# Patient Record
Sex: MALE | Race: WHITE | NOT HISPANIC OR LATINO | Employment: FULL TIME | URBAN - METROPOLITAN AREA
[De-identification: names, ages, dates, MRNs, and addresses within clinical notes are randomized per-mention and may not be internally consistent; named-entity substitution may affect disease eponyms.]

---

## 2023-11-22 ENCOUNTER — LAB (OUTPATIENT)
Dept: LAB | Facility: HOSPITAL | Age: 53
End: 2023-11-22
Payer: OTHER GOVERNMENT

## 2023-11-22 ENCOUNTER — OFFICE VISIT (OUTPATIENT)
Dept: FAMILY MEDICINE CLINIC | Facility: CLINIC | Age: 53
End: 2023-11-22
Payer: OTHER GOVERNMENT

## 2023-11-22 VITALS
BODY MASS INDEX: 34.1 KG/M2 | SYSTOLIC BLOOD PRESSURE: 132 MMHG | HEART RATE: 72 BPM | WEIGHT: 280 LBS | HEIGHT: 76 IN | DIASTOLIC BLOOD PRESSURE: 80 MMHG | OXYGEN SATURATION: 98 %

## 2023-11-22 DIAGNOSIS — I88.9 LYMPHADENITIS: ICD-10-CM

## 2023-11-22 DIAGNOSIS — R22.1 NECK MASS: ICD-10-CM

## 2023-11-22 DIAGNOSIS — Z13.0 SCREENING FOR DEFICIENCY ANEMIA: ICD-10-CM

## 2023-11-22 DIAGNOSIS — Z11.59 ENCOUNTER FOR HCV SCREENING TEST FOR LOW RISK PATIENT: ICD-10-CM

## 2023-11-22 DIAGNOSIS — Z76.89 ENCOUNTER TO ESTABLISH CARE: Primary | ICD-10-CM

## 2023-11-22 DIAGNOSIS — Z13.220 SCREENING FOR LIPID DISORDERS: ICD-10-CM

## 2023-11-22 DIAGNOSIS — Z11.4 SCREENING FOR HIV (HUMAN IMMUNODEFICIENCY VIRUS): ICD-10-CM

## 2023-11-22 LAB
ALBUMIN SERPL-MCNC: 4.8 G/DL (ref 3.5–5.2)
ALBUMIN/GLOB SERPL: 1.9 G/DL
ALP SERPL-CCNC: 121 U/L (ref 39–117)
ALT SERPL W P-5'-P-CCNC: 20 U/L (ref 1–41)
ANION GAP SERPL CALCULATED.3IONS-SCNC: 10.7 MMOL/L (ref 5–15)
AST SERPL-CCNC: 18 U/L (ref 1–40)
BASOPHILS # BLD AUTO: 0.04 10*3/MM3 (ref 0–0.2)
BASOPHILS NFR BLD AUTO: 0.7 % (ref 0–1.5)
BILIRUB SERPL-MCNC: 0.3 MG/DL (ref 0–1.2)
BUN SERPL-MCNC: 17 MG/DL (ref 6–20)
BUN/CREAT SERPL: 14.5 (ref 7–25)
CALCIUM SPEC-SCNC: 9.8 MG/DL (ref 8.6–10.5)
CHLORIDE SERPL-SCNC: 104 MMOL/L (ref 98–107)
CO2 SERPL-SCNC: 25.3 MMOL/L (ref 22–29)
CREAT SERPL-MCNC: 1.17 MG/DL (ref 0.76–1.27)
DEPRECATED RDW RBC AUTO: 43.4 FL (ref 37–54)
EGFRCR SERPLBLD CKD-EPI 2021: 73.6 ML/MIN/1.73
EOSINOPHIL # BLD AUTO: 0.11 10*3/MM3 (ref 0–0.4)
EOSINOPHIL NFR BLD AUTO: 2 % (ref 0.3–6.2)
ERYTHROCYTE [DISTWIDTH] IN BLOOD BY AUTOMATED COUNT: 13.2 % (ref 12.3–15.4)
GLOBULIN UR ELPH-MCNC: 2.5 GM/DL
GLUCOSE SERPL-MCNC: 94 MG/DL (ref 65–99)
HBA1C MFR BLD: 6 % (ref 4.8–5.6)
HCT VFR BLD AUTO: 45.1 % (ref 37.5–51)
HGB BLD-MCNC: 15.4 G/DL (ref 13–17.7)
IMM GRANULOCYTES # BLD AUTO: 0.02 10*3/MM3 (ref 0–0.05)
IMM GRANULOCYTES NFR BLD AUTO: 0.4 % (ref 0–0.5)
LYMPHOCYTES # BLD AUTO: 1.94 10*3/MM3 (ref 0.7–3.1)
LYMPHOCYTES NFR BLD AUTO: 34.5 % (ref 19.6–45.3)
MCH RBC QN AUTO: 30.9 PG (ref 26.6–33)
MCHC RBC AUTO-ENTMCNC: 34.1 G/DL (ref 31.5–35.7)
MCV RBC AUTO: 90.6 FL (ref 79–97)
MONOCYTES # BLD AUTO: 0.35 10*3/MM3 (ref 0.1–0.9)
MONOCYTES NFR BLD AUTO: 6.2 % (ref 5–12)
NEUTROPHILS NFR BLD AUTO: 3.16 10*3/MM3 (ref 1.7–7)
NEUTROPHILS NFR BLD AUTO: 56.2 % (ref 42.7–76)
NRBC BLD AUTO-RTO: 0 /100 WBC (ref 0–0.2)
PLATELET # BLD AUTO: 285 10*3/MM3 (ref 140–450)
PMV BLD AUTO: 9.6 FL (ref 6–12)
POTASSIUM SERPL-SCNC: 4.5 MMOL/L (ref 3.5–5.2)
PROT SERPL-MCNC: 7.3 G/DL (ref 6–8.5)
RBC # BLD AUTO: 4.98 10*6/MM3 (ref 4.14–5.8)
SODIUM SERPL-SCNC: 140 MMOL/L (ref 136–145)
TSH SERPL DL<=0.05 MIU/L-ACNC: 2.18 UIU/ML (ref 0.27–4.2)
WBC NRBC COR # BLD AUTO: 5.62 10*3/MM3 (ref 3.4–10.8)

## 2023-11-22 PROCEDURE — 83036 HEMOGLOBIN GLYCOSYLATED A1C: CPT | Performed by: STUDENT IN AN ORGANIZED HEALTH CARE EDUCATION/TRAINING PROGRAM

## 2023-11-22 PROCEDURE — 80053 COMPREHEN METABOLIC PANEL: CPT | Performed by: STUDENT IN AN ORGANIZED HEALTH CARE EDUCATION/TRAINING PROGRAM

## 2023-11-22 PROCEDURE — 84443 ASSAY THYROID STIM HORMONE: CPT | Performed by: STUDENT IN AN ORGANIZED HEALTH CARE EDUCATION/TRAINING PROGRAM

## 2023-11-22 PROCEDURE — 85025 COMPLETE CBC W/AUTO DIFF WBC: CPT | Performed by: STUDENT IN AN ORGANIZED HEALTH CARE EDUCATION/TRAINING PROGRAM

## 2023-11-22 RX ORDER — AMOXICILLIN AND CLAVULANATE POTASSIUM 875; 125 MG/1; MG/1
1 TABLET, FILM COATED ORAL 2 TIMES DAILY
Qty: 20 TABLET | Refills: 0 | Status: SHIPPED | OUTPATIENT
Start: 2023-11-22

## 2023-11-22 RX ORDER — VALACYCLOVIR HCL 500 MG
TABLET ORAL
COMMUNITY

## 2023-11-22 NOTE — PROGRESS NOTES
"    New Patient Office Visit      Date: 2023   Patient Name: Long Gleason  : 1970   MRN: [id number]     Chief Complaint:    Chief Complaint   Patient presents with    LYMPH NODE ISSUES       History of Present Illness: Long Gleason is a 53 y.o. male who is here today to establish care.      Subjective      HPI:  Pt presents with \"swollen lymph node.\" Has noticed left sided neck swelling over the last 2 weeks. Reports that this has been a frequent occurrence for him over the last few years. First episode was ~ when he was diagnosed with HSV and EBV. Reports intermittent lymphdenopathy since that time. Recalls that the area was imaged in . At that time was told is was lymph tissue and \"filled with white blood cells.\" Saw a surgeon and recalls being told it was too close to carotid artery so it could not be excised. States this is the largest the area has been. Has appt w ENT at end of this month at .   Now on Valtrex suppressive therapy. Tolerating it well.     Review of Systems:   Negative/not pertinent unless otherwise noted above in HPI.     Past Medical History: History reviewed. No pertinent past medical history.    Past Surgical History:   Past Surgical History:   Procedure Laterality Date    HERNIA REPAIR      TONSILLECTOMY         Family History: History reviewed. No pertinent family history.    Social History:   Social History     Socioeconomic History    Marital status: Single   Tobacco Use    Smoking status: Former     Types: Cigarettes     Quit date:      Years since quittin.9    Smokeless tobacco: Never   Substance and Sexual Activity    Alcohol use: Yes    Drug use: No    Sexual activity: Defer       Medications:     Current Outpatient Medications:     Valtrex 500 MG tablet, , Disp: , Rfl:     amoxicillin-clavulanate (AUGMENTIN) 875-125 MG per tablet, Take 1 tablet by mouth 2 (Two) Times a Day., Disp: 20 tablet, Rfl: 0    Allergies:   Allergies   Allergen " "Reactions    Latex Anaphylaxis    Penicillins Unknown (See Comments)     Pt will explain       Immunizations:    There is no immunization history on file for this patient.      Tobacco Use: Medium Risk (11/22/2023)    Patient History     Smoking Tobacco Use: Former     Smokeless Tobacco Use: Never     Passive Exposure: Not on file       Social History     Substance and Sexual Activity   Alcohol Use Yes        Social History     Substance and Sexual Activity   Drug Use No          Objective     Physical Exam:  Vital Signs:   Vitals:    11/22/23 1422   BP: 132/80   BP Location: Left arm   Patient Position: Sitting   Cuff Size: Adult   Pulse: 72   SpO2: 98%   Weight: 127 kg (280 lb)   Height: 193 cm (76\")     Body mass index is 34.08 kg/m².    Physical Exam  Vitals reviewed.   Constitutional:       General: He is not in acute distress.     Appearance: Normal appearance. He is obese.   HENT:      Head: Normocephalic and atraumatic.      Right Ear: Tympanic membrane normal.      Left Ear: Tympanic membrane normal.      Nose: Nose normal.      Mouth/Throat:      Mouth: Mucous membranes are moist.      Pharynx: Oropharynx is clear. No oropharyngeal exudate or posterior oropharyngeal erythema.   Eyes:      Extraocular Movements: Extraocular movements intact.      Conjunctiva/sclera: Conjunctivae normal.      Pupils: Pupils are equal, round, and reactive to light.   Neck:      Comments: Mobile mass in left submandibular region, TTP. Moderate amount of soft tissue swelling. No overlying skin changes.   Cardiovascular:      Rate and Rhythm: Normal rate and regular rhythm.      Heart sounds: Normal heart sounds. No murmur heard.  Pulmonary:      Effort: Pulmonary effort is normal.      Breath sounds: No wheezing.   Abdominal:      General: Abdomen is flat. Bowel sounds are normal.      Palpations: Abdomen is soft. There is no mass.      Tenderness: There is no abdominal tenderness.   Musculoskeletal:         General: Normal " range of motion.      Cervical back: Normal range of motion.   Neurological:      General: No focal deficit present.      Mental Status: He is alert.   Psychiatric:         Mood and Affect: Mood normal.         Thought Content: Thought content normal.         Assessment / Plan      Assessment/Plan:   Diagnoses and all orders for this visit:    1. Encounter to establish care (Primary)  -     Comprehensive Metabolic Panel; Future  -     CBC & Differential; Future  -     TSH Rfx On Abnormal To Free T4; Future  -     Hemoglobin A1c; Future  -     Comprehensive Metabolic Panel  -     CBC & Differential  -     TSH Rfx On Abnormal To Free T4  -     Hemoglobin A1c    2. Neck mass  -     US Head Neck Soft Tissue; Future    3. Lymphadenitis  -     amoxicillin-clavulanate (AUGMENTIN) 875-125 MG per tablet; Take 1 tablet by mouth 2 (Two) Times a Day.  Dispense: 20 tablet; Refill: 0    4. Encounter for HCV screening test for low risk patient    5. Screening for HIV (human immunodeficiency virus)    6. Screening for deficiency anemia    7. Screening for lipid disorders  -     CBC & Differential; Future  -     CBC & Differential    Screening labs ordered  STAT Neck US to further evaluate neck mass- suspect lymphadenitis vs enlarged salivary gland?   Will treat for possible infectious etiology with Augmentin  Keep ENT appt for FU      Healthcare Maintenance:  Counseling provided based on age appropriate USPSTF guidelines.  BMI cannot be calculated due to outdated height or weight values.  Please input a current height/weight in Vitals and re-renter BMIFOLLOWUP in Note to pull in correct documentation based on BMI range.    Long [place] voices understanding and acceptance of this advice and will call back with any further questions or concerns. AVS with preventive healthcare tips printed for patient.         Follow Up:   No follow-ups on file.        Karrie Egan DO  [facility] [place] [address]

## 2023-11-22 NOTE — PATIENT INSTRUCTIONS
You should receive a call from the Radiology department and from the ENT office to schedule your visits.

## 2023-11-24 ENCOUNTER — HOSPITAL ENCOUNTER (OUTPATIENT)
Dept: ULTRASOUND IMAGING | Facility: HOSPITAL | Age: 53
Discharge: HOME | End: 2023-11-24
Admitting: STUDENT IN AN ORGANIZED HEALTH CARE EDUCATION/TRAINING PROGRAM
Payer: OTHER GOVERNMENT

## 2023-11-24 DIAGNOSIS — R22.1 NECK MASS: ICD-10-CM

## 2023-11-24 PROCEDURE — 76536 US EXAM OF HEAD AND NECK: CPT

## 2023-11-27 ENCOUNTER — TELEPHONE (OUTPATIENT)
Dept: FAMILY MEDICINE CLINIC | Facility: CLINIC | Age: 53
End: 2023-11-27
Payer: OTHER GOVERNMENT

## 2023-11-29 ENCOUNTER — TELEPHONE (OUTPATIENT)
Dept: FAMILY MEDICINE CLINIC | Facility: CLINIC | Age: 53
End: 2023-11-29
Payer: OTHER GOVERNMENT

## 2023-11-29 ENCOUNTER — PATIENT MESSAGE (OUTPATIENT)
Dept: FAMILY MEDICINE CLINIC | Facility: CLINIC | Age: 53
End: 2023-11-29
Payer: OTHER GOVERNMENT

## 2023-11-29 DIAGNOSIS — R22.1 NECK MASS: Primary | ICD-10-CM

## 2023-11-29 NOTE — TELEPHONE ENCOUNTER
PATIENT CALLED BACK DUE TO DIS-CONNECTION FROM PREVIOUS CALL, WHEN ASKED WHAT SOME OF THE QUESTIONS THE PATIENT HAD SO I CAN RELAY THEM TO THE PROVIDER FOR WHEN SHE CALLS HIM BACK, TO WHICH HE REFUSED TO RELAY AND ASKED TO SPEAK TO PCP DIRECTLY.      PLEASE ADVISE

## 2023-11-29 NOTE — TELEPHONE ENCOUNTER
Caller: Long Gleason    Relationship: Self    Best call back number: [phone]    What is the best time to reach you: TODAY    Who are you requesting to speak with (clinical staff, provider,  specific staff member): DR PICKARD    Do you know the name of the person who called: SELF    What was the call regarding: PATIENT HAS APPOINTMENT WITH ENT TOMORROW AND WOULD LIKE TO ASK A FEW QUESTIONS TO DR PICKARD. PLEASE CALL PATIENT BACK TODAY

## 2023-11-29 NOTE — TELEPHONE ENCOUNTER
Unable to reach Long Gleason by phone or leave message.    Hub may relay message and document.    Tried to contact patient but it did not connect.   Called to discuss questions patient had.  Please document further questions or concerns patient has.

## 2023-11-30 ENCOUNTER — TELEPHONE (OUTPATIENT)
Dept: FAMILY MEDICINE CLINIC | Facility: CLINIC | Age: 53
End: 2023-11-30
Payer: OTHER GOVERNMENT

## 2023-11-30 ENCOUNTER — PATIENT ROUNDING (BHMG ONLY) (OUTPATIENT)
Dept: FAMILY MEDICINE CLINIC | Facility: CLINIC | Age: 53
End: 2023-11-30
Payer: OTHER GOVERNMENT

## 2023-12-01 ENCOUNTER — TELEPHONE (OUTPATIENT)
Dept: FAMILY MEDICINE CLINIC | Facility: CLINIC | Age: 53
End: 2023-12-01
Payer: OTHER GOVERNMENT

## 2023-12-01 NOTE — TELEPHONE ENCOUNTER
From: Long Gleason  To: Karrie Egan  Sent: 11/29/2023 4:45 PM EST  Subject: Karrie    Please call me on shop phone [phone]  You have been calling me while I have been on the cell phone and it will not allow me to flip over to incoming call.     Ancelmo

## 2023-12-01 NOTE — TELEPHONE ENCOUNTER
Patient states he had a reaction to Amoxicilian.  Rash, hives, lot of itching.  Patient did see a specialist @  & they want to a full neck scan; wanted to see if Dr. Egan could [facility].  Patient would like a call back.

## 2023-12-01 NOTE — TELEPHONE ENCOUNTER
Cannot locate notes just yet from [facility]. Looking into the portal it does not show any signed notes as of yet. Visit was yesterday and may not be uploaded just yet.

## 2023-12-01 NOTE — TELEPHONE ENCOUNTER
Discussed over the phone w patient. Was told to try to get CT of neck through our office to see if it could be scheduled sooner than through his ENT at . Order was placed.

## 2023-12-15 ENCOUNTER — HOSPITAL ENCOUNTER (OUTPATIENT)
Dept: CT IMAGING | Facility: HOSPITAL | Age: 53
Discharge: HOME | End: 2023-12-15
Admitting: STUDENT IN AN ORGANIZED HEALTH CARE EDUCATION/TRAINING PROGRAM
Payer: OTHER GOVERNMENT

## 2023-12-15 DIAGNOSIS — R22.1 NECK MASS: ICD-10-CM

## 2023-12-15 PROCEDURE — 70491 CT SOFT TISSUE NECK W/DYE: CPT

## 2023-12-15 PROCEDURE — 25510000001 IOPAMIDOL 61 % SOLUTION: Performed by: STUDENT IN AN ORGANIZED HEALTH CARE EDUCATION/TRAINING PROGRAM

## 2023-12-15 RX ADMIN — IOPAMIDOL 75 ML: 612 INJECTION, SOLUTION INTRAVENOUS at 15:17

## 2023-12-18 ENCOUNTER — TELEPHONE (OUTPATIENT)
Dept: FAMILY MEDICINE CLINIC | Facility: CLINIC | Age: 53
End: 2023-12-18

## 2023-12-18 ENCOUNTER — OFFICE VISIT (OUTPATIENT)
Dept: FAMILY MEDICINE CLINIC | Facility: CLINIC | Age: 53
End: 2023-12-18
Payer: OTHER GOVERNMENT

## 2023-12-18 VITALS
WEIGHT: 306 LBS | BODY MASS INDEX: 37.26 KG/M2 | DIASTOLIC BLOOD PRESSURE: 70 MMHG | SYSTOLIC BLOOD PRESSURE: 132 MMHG | HEART RATE: 90 BPM | OXYGEN SATURATION: 100 % | HEIGHT: 76 IN

## 2023-12-18 DIAGNOSIS — C77.0 SQUAMOUS CELL CARCINOMA METASTATIC TO LYMPH NODES OF HEAD AND NECK: Primary | ICD-10-CM

## 2023-12-18 RX ORDER — FLUOROMETHOLONE 0.1 %
SUSPENSION, DROPS(FINAL DOSAGE FORM)(ML) OPHTHALMIC (EYE)
COMMUNITY
Start: 2023-12-16

## 2023-12-18 NOTE — PROGRESS NOTES
Chief Complaint   Patient presents with    LYMPH NODE ISSUES     Lab follow up        HPI:  Long Gleason is a 53 y.o. male who presents today for follow up of lymphadenopathy/discuss CT results.    PT initially presented to office on 11/22/23 with intermittent several year history of left sided submandibular mass/swelling. At that visit he was placed on Augmentin and US was ordered. This was performed on 11/24/23 and shows abnormally enlarged lymph nodes and recommended CT scan for further evaluation. Patient initially declined ordering CT scan and actually established care with ENT at [facility]. CT scan was ordered by ENT provider however pt then requested that I order the CT scan instead as it could be done sooner through [facility] [place] facility.   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  His CT scan was performed on 12/15/23 and showed concern for metastatic disease. Results shown below:    CT SOFT TISSUE NECK W CONTRAST     Date of Exam: 12/15/2023 3:05 PM EST     Indication: Neck mass, nonpulsatile  Neck mass/swelling, inconclusive US.     Comparison: Ultrasound 11/24/2023.     Technique: Axial CT images were obtained of the neck after the uneventful intravenous administration of 75 mL Isovue-300.  Reconstructed coronal and sagittal images were also obtained. Automated exposure control and iterative construction methods were   used.        Findings:  Limited intracranial evaluation demonstrates no acute findings. The orbits appear normal. The paranasal sinuses are clear. The lung apices are clear. The osseous structures demonstrate no evidence of acute fracture or aggressive osseous lesion. Vascular   structures appear patent without evidence of high-grade atherosclerotic narrowing. Correlating with the findings on recent ultrasound, there our prominent abnormal round findings most  consistent with pathologic cervical lymph nodes corresponding to left   level 2B and level 3, with the larger level 2B lymph node measuring 4.1 x 3.1 cm, more inferior lymph node measuring 3 cm short axis. An additional adjacent level 3 lymph node measures 11 mm, mildly enlarged. No definite pathologic adenopathy is present   on the right. The parotid and submandibular glands appear normal and symmetric bilaterally. Evaluation of the aerodigestive tract structures demonstrates some mild questionable asymmetric fullness at the level of the left tongue base, for example on   image 48 of series 2, otherwise without discrete aerodigestive tract mass visualized. The thyroid appears generally homogeneous.     IMPRESSION:  Impression: Abnormally rounded soft tissue findings on the left corresponding to recent ultrasound have the appearance of likely pathologic left level 2 and level 3 cervical lymph nodes. Findings are most suspicious for metastatic involvement. There is   some subtle fullness noted at the left tongue base, otherwise without definite aerodigestive tract primary lesion. Consider otolaryngology consultation for direct laryngoscopy and possible arpit soft tissue sampling.    PE:  Vitals:    12/18/23 1330   BP: 132/70   Pulse: 90   SpO2: 100%      Body mass index is 37.25 kg/m².    Gen Appearance: NAD  HEENT: Normocephalic, PERRL, no thyromegaly, trachea midline  Heart: RRR, normal S1 and S2, no murmur  Lungs: CTA b/l, no wheezing, no crackles  MSK: Moves all extremities well, normal gait, no peripheral edema  Pulses: Palpable and equal b/l  Neuro: No focal deficits    Current Outpatient Medications   Medication Sig Dispense Refill    amoxicillin-clavulanate (AUGMENTIN) 875-125 MG per tablet Take 1 tablet by mouth 2 (Two) Times a Day. 20 tablet 0    fluorometholone (FML) 0.1 % ophthalmic suspension INSTILL 1 DROP RIGHT EYE FOUR TIMES DAILY      Valtrex 500 MG tablet        No current facility-administered  medications for this visit.        A/P:  Diagnoses and all orders for this visit:    1. 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  Needs ASAP follow-up with ENT.  I contacted his ENT provider's office at  today and they were able to schedule patient for follow-up on 1/4.  Patient would like to be evaluated sooner than that if possible and requests referral to [facility].  I informed him that I was not sure that he could be seen any sooner by a new provider, particularly since he had already started workup with  ENT.  He also needs to establish with oncology and his ENT office stated that they would be making this referral to [facility].  Patient did have several questions regarding his diagnosis and next steps and unfortunately I explained to him that I was unable to answer these for him at this time.  He can return to me for follow-up after he is evaluated by ENT if he would still like to transfer care to [facility] I am happy to place a referral for him.    Dictated Utilizing Dragon Dictation    Please note that portions of this note were completed with a voice recognition program.    Part of this note may be an electronic transcription/translation of spoken language to printed text using the Dragon Dictation System.

## 2023-12-18 NOTE — TELEPHONE ENCOUNTER
Reviewed results of CT scan during appointment on 12/18. I contacted [facility] office that patient has already established with as they had ordered his biopsy which confirms diagnosis of metastatic SCC. Needs FU ASAP for likely laryngoscopy and additional imaging. HE was contacted by their office during our visit and scheduled for 1/4.

## 2023-12-22 ENCOUNTER — PATIENT MESSAGE (OUTPATIENT)
Dept: FAMILY MEDICINE CLINIC | Facility: CLINIC | Age: 53
End: 2023-12-22
Payer: OTHER GOVERNMENT

## 2023-12-22 DIAGNOSIS — C77.0 SQUAMOUS CELL CARCINOMA METASTATIC TO LYMPH NODES OF HEAD AND NECK: Primary | ICD-10-CM

## 2023-12-26 ENCOUNTER — TRANSCRIBE ORDERS (OUTPATIENT)
Dept: ADMINISTRATIVE | Facility: HOSPITAL | Age: 53
End: 2023-12-26
Payer: OTHER GOVERNMENT

## 2023-12-26 DIAGNOSIS — C76.0 CANCER OF NECK: Primary | ICD-10-CM

## 2023-12-29 NOTE — TELEPHONE ENCOUNTER
From: Karrie Egan  To: Long Gleason  Sent: 12/22/2023 3:11 PM EST  Subject: COURTNEY Alves,    I apologize for just getting back with you. After talking with our referral coordinator I do think that since you're already established with Dr. Karimi at  seeing him on 1/4 is going to be the quickest and best course of action for you to start your treatment. This helps keep all of your records in one place and gives you expedited access to the [facility]. I do think once you follow up on 1/4 things will move quickly from there.    Again, my apologies for the delay.  Best,  Karrie Egan, DO

## 2024-01-08 ENCOUNTER — OFFICE VISIT (OUTPATIENT)
Dept: FAMILY MEDICINE CLINIC | Facility: CLINIC | Age: 54
End: 2024-01-08
Payer: OTHER GOVERNMENT

## 2024-01-08 VITALS
SYSTOLIC BLOOD PRESSURE: 122 MMHG | WEIGHT: 303 LBS | HEART RATE: 72 BPM | DIASTOLIC BLOOD PRESSURE: 90 MMHG | BODY MASS INDEX: 36.9 KG/M2 | OXYGEN SATURATION: 97 % | HEIGHT: 76 IN

## 2024-01-08 DIAGNOSIS — C77.0 SQUAMOUS CELL CARCINOMA METASTATIC TO LYMPH NODES OF HEAD AND NECK: Primary | ICD-10-CM

## 2024-01-08 PROCEDURE — 99214 OFFICE O/P EST MOD 30 MIN: CPT | Performed by: STUDENT IN AN ORGANIZED HEALTH CARE EDUCATION/TRAINING PROGRAM

## 2024-01-08 NOTE — PROGRESS NOTES
Chief Complaint   Patient presents with    Squamous cell carcinoma metastatic to lymph nodes of head a     Follow up        HPI:  Long Gleason is a 53 y.o. male who presents today to discuss recent cancer diagnosis.     Patient followed up with his ENT at  last week to discuss recent biopsy results showing squamous cell carcinoma w mets to submandibular LN's.  Primary unconfirmed at this time but suspected to possibly be from tongue given recent CT scan findings.  He has a PET scan scheduled for tomorrow with follow-up with his ENT immediately after to review findings.  Pt is frustrated and admittedly states he is scared about his diagnosis.  He has completely changed his dietary habits and is taking a lot of immune boosting supplements.  He continues to feel well and denies any fatigue, pain, dysphagia or other symptoms.  He is hopeful that cancer will be amenable to radiation or surgical therapy only rather than have to take chemotherapy treatments.      PE:  Vitals:    01/08/24 1257   BP: 122/90   Pulse: 72   SpO2: 97%      Body mass index is 36.88 kg/m².    Gen Appearance: NAD  HEENT: Normocephalic, large firm left sided submandibular nodule, non mobile  Heart: RRR, normal S1 and S2, no murmur  Lungs: CTA b/l, no wheezing, no crackles  MSK: Moves all extremities well, normal gait, no peripheral edema  Neuro: No focal deficits    Current Outpatient Medications   Medication Sig Dispense Refill    amoxicillin-clavulanate (AUGMENTIN) 875-125 MG per tablet Take 1 tablet by mouth 2 (Two) Times a Day. 20 tablet 0    Valtrex 500 MG tablet       fluorometholone (FML) 0.1 % ophthalmic suspension INSTILL 1 DROP RIGHT EYE FOUR TIMES DAILY (Patient not taking: Reported on 1/8/2024)       No current facility-administered medications for this visit.        A/P:  Diagnoses and all orders for this visit:    1. 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  Stressed importance of discussing his concerns with his ENT/oncology provider after PET scan has been performed tomorrow.  At this point we do not know what his treatment course will look like without additional staging information.    I do think his efforts towards healthy lifestyle changes (diet, exercise, vitamins) are a positive change and encouraged him to continue these practices.  However, I stressed that these alone would not be enough to treat/cure his cancer.      I spent 35 minutes caring for Long on this date of service. This time includes time spent by me in the following activities: preparing for the visit, reviewing tests, obtaining and/or reviewing a separately obtained history, counseling and educating the patient/family/caregiver, and documenting information in the medical record    FU in 6 months for physical, sooner PRN  Pt will cont to keep me informed via my chart on his plan of care    Dictated Utilizing Dragon Dictation    Please note that portions of this note were completed with a voice recognition program.    Part of this note may be an electronic transcription/translation of spoken language to printed text using the Dragon Dictation System.

## 2024-01-09 ENCOUNTER — HOSPITAL ENCOUNTER (OUTPATIENT)
Dept: PET IMAGING | Facility: HOSPITAL | Age: 54
Discharge: HOME | End: 2024-01-09
Payer: OTHER GOVERNMENT

## 2024-01-09 DIAGNOSIS — C76.0 CANCER OF NECK: ICD-10-CM

## 2024-01-09 LAB — GLUCOSE BLDC GLUCOMTR-MCNC: 97 MG/DL (ref 70–130)

## 2024-01-09 PROCEDURE — 0 FLUDEOXYGLUCOSE F18 SOLUTION: Performed by: OTOLARYNGOLOGY

## 2024-01-09 PROCEDURE — 82948 REAGENT STRIP/BLOOD GLUCOSE: CPT

## 2024-01-09 PROCEDURE — A9552 F18 FDG: HCPCS | Performed by: OTOLARYNGOLOGY

## 2024-01-09 PROCEDURE — 78815 PET IMAGE W/CT SKULL-THIGH: CPT

## 2024-01-09 RX ADMIN — FLUDEOXYGLUCOSE F 18 1 DOSE: 200 INJECTION, SOLUTION INTRAVENOUS at 11:33

## 2024-01-11 ENCOUNTER — TELEPHONE (OUTPATIENT)
Dept: FAMILY MEDICINE CLINIC | Facility: CLINIC | Age: 54
End: 2024-01-11
Payer: OTHER GOVERNMENT

## 2024-01-11 NOTE — TELEPHONE ENCOUNTER
Caller: Long Gleason    Relationship: Self    Best call back number: [phone]     What is the medical concern/diagnosis: BIOPSY ON THROAT    What specialty or service is being requested: ANESTHESIOLOGIST    What is the provider, practice or medical service name:  ANESTHESIOLOGY    What is the office location:     What is the office phone number: [phone]    Any additional details: [facility] DR CLAUDINE MENDEZ IS WHO IS DOING THE PROCEDURE    HIS PROCEDURE IS SCHEDULED FOR WEDNESDAY

## 2024-02-01 ENCOUNTER — TELEPHONE (OUTPATIENT)
Dept: FAMILY MEDICINE CLINIC | Facility: CLINIC | Age: 54
End: 2024-02-01
Payer: OTHER GOVERNMENT

## 2024-02-01 NOTE — TELEPHONE ENCOUNTER
Caller: Long Gleasno    Relationship: Self    Best call back number: [phone]     What is the best time to reach you: ANY     Who are you requesting to speak with (clinical staff, provider,  specific staff member):   Karrie Egan, DO     What was the call regarding: DISCUSS AND CATCH UP ON HOW HIS [facility] TREATMENTS ARE GOING     Is it okay if the provider responds through MyChart: NO

## 2024-02-02 NOTE — TELEPHONE ENCOUNTER
Unable to reach Long Gleason by phone or leave message.    Hub may relay message and document.    Patient should schedule appointment to come in and discuss further with Dr. Egan.

## 2024-02-22 ENCOUNTER — PATIENT MESSAGE (OUTPATIENT)
Dept: FAMILY MEDICINE CLINIC | Facility: CLINIC | Age: 54
End: 2024-02-22
Payer: OTHER GOVERNMENT

## 2024-02-28 ENCOUNTER — OFFICE VISIT (OUTPATIENT)
Dept: FAMILY MEDICINE CLINIC | Facility: CLINIC | Age: 54
End: 2024-02-28
Payer: OTHER GOVERNMENT

## 2024-02-28 VITALS
WEIGHT: 306 LBS | SYSTOLIC BLOOD PRESSURE: 132 MMHG | HEART RATE: 82 BPM | OXYGEN SATURATION: 100 % | HEIGHT: 76 IN | BODY MASS INDEX: 37.26 KG/M2 | DIASTOLIC BLOOD PRESSURE: 84 MMHG

## 2024-02-28 DIAGNOSIS — F41.8 SITUATIONAL ANXIETY: ICD-10-CM

## 2024-02-28 DIAGNOSIS — C77.0 SQUAMOUS CELL CARCINOMA METASTATIC TO LYMPH NODES OF HEAD AND NECK: Primary | ICD-10-CM

## 2024-02-28 PROBLEM — C44.92 SQUAMOUS CELL CARCINOMA METASTATIC TO LYMPH NODES OF HEAD AND NECK: Status: ACTIVE | Noted: 2024-02-28

## 2024-02-28 PROCEDURE — 99214 OFFICE O/P EST MOD 30 MIN: CPT | Performed by: STUDENT IN AN ORGANIZED HEALTH CARE EDUCATION/TRAINING PROGRAM

## 2024-02-28 RX ORDER — HYDROXYZINE HYDROCHLORIDE 25 MG/1
TABLET, FILM COATED ORAL
Qty: 60 TABLET | Refills: 2 | Status: SHIPPED | OUTPATIENT
Start: 2024-02-28

## 2024-02-28 RX ORDER — PREDNISOLONE ACETATE 10 MG/ML
SUSPENSION/ DROPS OPHTHALMIC
COMMUNITY

## 2024-02-28 NOTE — PROGRESS NOTES
Chief Complaint   Patient presents with    Squamous cell carcinoma metastatic to lymph nodes of head a       HPI:  Long Gleason is a 53 y.o. male who presents today for anxiety.    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 He is following w Oncology at . Has been frustrated with certain points of his care and has considered switching to [facility] provider but wants to stay in [facility].  The radiation treatments have caused a great deal of anxiety. Last week felt like he couldn't breathe or move just before treatment started and had a sort of panic attack. He is interested in trying a medication to help with the anxiety.   Continues to take natural supplements and treatments to help fight his cancer. He does feel that these therapies are helping. He has declined chemotherapy so far. He is worried about later effects of radiation treatment.       PE:  Vitals:    02/28/24 0840   BP: 132/84   Pulse: 82   SpO2: 100%      Body mass index is 37.25 kg/m².    Gen Appearance: NAD  HEENT: Normocephalic, EOMI, large mass noted to left lateral neck  Lungs: Normal WOB  MSK: Moves all extremities well, normal gait, no peripheral edema  Neuro: No focal deficits    Current Outpatient Medications   Medication Sig Dispense Refill    prednisoLONE acetate (PRED FORTE) 1 % ophthalmic suspension INSTILL 1 DROP EVERY 4 HOURS IN RIGHT EYE      Valtrex 500 MG tablet       hydrOXYzine (ATARAX) 25 MG tablet Take 1 or 2 tablets every 8 hours as needed for anxiety. 60 tablet 2     No current facility-administered medications for this visit.        A/P:  Diagnoses and all orders for this visit:    1. 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 Situational anxiety  -     hydrOXYzine (ATARAX) 25 MG tablet; Take 1 or 2 tablets every 8 hours as needed for anxiety.  Dispense: 60 tablet; Refill: 2       Squamous Cell Carcinoma of Neck  -Following  w Oncology at . Undergoing radiation therapy for now, has declined chemotherapy. We spent several minutes discussing his treatment plan and I do agree that staying with his current provider is in his best interest for continuity of his care. I strongly encouraged him to discuss his cancer treatments with his oncologist and consider all options as I am not familiar with treatments available for his condition and really cannot offer much insight. I do think his focus on improving his overall health through healthy nutrition and supplements is a good thing and encouraged him to continue doing so but I would not recommend this as the sole treatment for his cancer.    Situational Anxiety  -Trial Hydroxyzine PRN. Discussed alternative therapies if he were to have no improvement with hydroxyzine, however he prefers to start with non controlled option for now.    Return in about 6 weeks (around 4/10/2024) for med check.     I spent at least 40 minutes caring for Long on this date of service. This time includes time spent by me in the following activities: preparing for the visit, reviewing tests, obtaining and/or reviewing a separately obtained history, counseling and educating the patient/family/caregiver, and documenting information in the medical record      Dictated Utilizing Dragon Dictation    Please note that portions of this note were completed with a voice recognition program.    Part of this note may be an electronic transcription/translation of spoken language to printed text using the Dragon Dictation System.

## 2024-03-08 DIAGNOSIS — F41.8 SITUATIONAL ANXIETY: ICD-10-CM

## 2024-03-08 RX ORDER — HYDROXYZINE HYDROCHLORIDE 25 MG/1
TABLET, FILM COATED ORAL
Qty: 60 TABLET | Refills: 2 | Status: SHIPPED | OUTPATIENT
Start: 2024-03-08

## 2024-03-08 NOTE — TELEPHONE ENCOUNTER
From: Long Gleason  To: Karrie Egan  Sent: 2/22/2024 4:52 PM EST  Subject: Follow Up [facility] Treatment    Karrie   I wanted to let you know that I am okay, but dealing with frustration at [facility]. Since the end of 2023, when I came back after the holidays, they have scheduled follow up consultation with Dr. Karimi, and two consultations with the Oncologist who specialize in HPV infections. I also had the laryngoscopy (Jan 17) and it indicated complete healing and no sign of carcinoma in the OPA or NPA. The problem I have had is the time that is elapsing between these meetings. I just now got to the point where radiation treatment of the tumor might move forward. The treatment requires wearing a mask that covers the face and neck and firmly braces the patient on the treatment table. This causes a lot of anxiety with patients and I am no exception. They asked me to ask Dr. Karimi about doing a prescription for the duration of the treatment cycle and he said I needed to ask my primary doctor, so I have to delay treatment until next week when I come to see you. More delay. Due to this frustration, I   would like to possibly move my treatment to [facility] to streamline it. Its also MUCH closer to my office and home. We can discuss it on Tuesday.     I called a few weeks ago to give you an update but missed the call back. Today they told me you had sent a message asking me to make an appointment so I have done that instead. I thought a phone call would be more prudent, but a visit is fine. See you on Tuesday.

## 2024-03-13 ENCOUNTER — OFFICE VISIT (OUTPATIENT)
Dept: FAMILY MEDICINE CLINIC | Facility: CLINIC | Age: 54
End: 2024-03-13
Payer: OTHER GOVERNMENT

## 2024-03-13 VITALS
HEIGHT: 76 IN | WEIGHT: 300 LBS | DIASTOLIC BLOOD PRESSURE: 80 MMHG | HEART RATE: 93 BPM | SYSTOLIC BLOOD PRESSURE: 122 MMHG | OXYGEN SATURATION: 100 % | BODY MASS INDEX: 36.53 KG/M2

## 2024-03-13 DIAGNOSIS — J06.9 UPPER RESPIRATORY TRACT INFECTION, UNSPECIFIED TYPE: Primary | ICD-10-CM

## 2024-03-13 DIAGNOSIS — K12.1 ORAL ULCERATION: ICD-10-CM

## 2024-03-13 DIAGNOSIS — C77.0 SQUAMOUS CELL CARCINOMA METASTATIC TO LYMPH NODES OF HEAD AND NECK: ICD-10-CM

## 2024-03-13 PROCEDURE — 99214 OFFICE O/P EST MOD 30 MIN: CPT | Performed by: STUDENT IN AN ORGANIZED HEALTH CARE EDUCATION/TRAINING PROGRAM

## 2024-03-13 RX ORDER — DOXYCYCLINE HYCLATE 100 MG/1
100 CAPSULE ORAL 2 TIMES DAILY
Qty: 14 CAPSULE | Refills: 0 | Status: SHIPPED | OUTPATIENT
Start: 2024-03-13 | End: 2024-03-20

## 2024-03-13 RX ORDER — CHLORHEXIDINE GLUCONATE ORAL RINSE 1.2 MG/ML
SOLUTION DENTAL
Qty: 118 ML | Refills: 0 | Status: SHIPPED | OUTPATIENT
Start: 2024-03-13

## 2024-03-13 NOTE — PROGRESS NOTES
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 male who presents today for FU, acute concerns of URI.    Pt has been receiving radiation treatment to left neck at  for treatment of squamous cell carcinoma. Now starting to notice a decrease in size of his lymph node and is happy about this progress.  Last Thursday started to develop cough, drainage and \"cold\" symptoms. Over the weekend he lost his taste and smell. He is concerned and upset about the loss of his taste and smell and worries about how long it will last. Spoke with his radiation oncologist about his symptoms and was instructed to FU w PCP for treatment.  Also has an ulcer underneath right side of tongue that is painful.      PE:  Vitals:    03/13/24 1104   BP: 122/80   Pulse: 93   SpO2: 100%      Body mass index is 36.52 kg/m².    Gen Appearance: NAD  HEENT: Normocephalic, left submandibular mass- reduced in size from prior exam  Heart: RRR, normal S1 and S2, no murmur  Lungs: faint expiratory wheeze cleared with coughing  MSK: Moves all extremities well, normal gait, no peripheral edema  Neuro: No focal deficits    Current Outpatient Medications   Medication Sig Dispense Refill    hydrOXYzine (ATARAX) 25 MG tablet Take 1 or 2 tablets every 8 hours as needed for anxiety. 60 tablet 2    prednisoLONE acetate (PRED FORTE) 1 % ophthalmic suspension INSTILL 1 DROP EVERY 4 HOURS IN RIGHT EYE      Valtrex 500 MG tablet       chlorhexidine (PERIDEX) 0.12 % solution Swish 15 mL (one capful) for thirty seconds then spit. Use twice daily. 118 mL 0    doxycycline (VIBRAMYCIN) 100 MG capsule Take 1 capsule by mouth 2 (Two) Times a Day for 7 days. 14 capsule 0     No current facility-administered medications for this visit.        A/P:  Diagnoses and all orders for this visit:    1. Upper respiratory tract infection, unspecified type (Primary)  -     doxycycline " (VIBRAMYCIN) 100 MG capsule; Take 1 capsule by mouth 2 (Two) Times a Day for 7 days.  Dispense: 14 capsule; Refill: 0    2. Oral ulceration  -     chlorhexidine (PERIDEX) 0.12 % solution; Swish 15 mL (one capful) for thirty seconds then spit. Use twice daily.  Dispense: 118 mL; Refill: 0    3. 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 His symptoms appear to be quite mild so I advised monitoring for now and conservative treatment. If symptoms persist >7 days can trial antibiotic. Pocket prescription for Doxycycline was sent (penicillin intolerance).    Mouth ulcer  -Trial Peridex mouth rinse. Advised pt to FU w Oncologist if ulceration does not heal w/in 1-2 weeks    SCC  -Cont following w Heme/Onc, currently undergoing radiation tx    No follow-ups on file.     Dictated Utilizing Dragon Dictation    Please note that portions of this note were completed with a voice recognition program.    Part of this note may be an electronic transcription/translation of spoken language to printed text using the Dragon Dictation System.

## 2024-04-17 ENCOUNTER — OFFICE VISIT (OUTPATIENT)
Dept: FAMILY MEDICINE CLINIC | Facility: CLINIC | Age: 54
End: 2024-04-17
Payer: OTHER GOVERNMENT

## 2024-04-17 VITALS
DIASTOLIC BLOOD PRESSURE: 80 MMHG | WEIGHT: 271 LBS | HEART RATE: 80 BPM | OXYGEN SATURATION: 97 % | SYSTOLIC BLOOD PRESSURE: 136 MMHG | BODY MASS INDEX: 33 KG/M2 | HEIGHT: 76 IN

## 2024-04-17 DIAGNOSIS — E66.9 CLASS 1 OBESITY WITHOUT SERIOUS COMORBIDITY WITH BODY MASS INDEX (BMI) OF 32.0 TO 32.9 IN ADULT, UNSPECIFIED OBESITY TYPE: ICD-10-CM

## 2024-04-17 DIAGNOSIS — C77.0 SQUAMOUS CELL CARCINOMA METASTATIC TO LYMPH NODES OF HEAD AND NECK: Primary | ICD-10-CM

## 2024-04-17 PROBLEM — E66.811 CLASS 1 OBESITY WITHOUT SERIOUS COMORBIDITY WITH BODY MASS INDEX (BMI) OF 32.0 TO 32.9 IN ADULT: Status: ACTIVE | Noted: 2024-04-17

## 2024-04-17 PROCEDURE — 99213 OFFICE O/P EST LOW 20 MIN: CPT | Performed by: STUDENT IN AN ORGANIZED HEALTH CARE EDUCATION/TRAINING PROGRAM

## 2024-04-17 RX ORDER — DOXYCYCLINE HYCLATE 100 MG/1
CAPSULE ORAL
COMMUNITY
Start: 2024-03-13

## 2024-04-17 NOTE — LETTER
April 17, 2024    Long Gleason  [address] [address]      To Whom It May Concern,    Long Gleason is a patient within my practice, I am familiar with his medical history. I am requesting that he be exempt from strenuous or excessive physical activity. He is currently undergoing treatment for a medical condition which limits his activity.      If you have further questions please do not hesitate to reach out to our office.     Sincerely,          Karrie Egan, DO

## 2024-04-17 NOTE — ASSESSMENT & PLAN NOTE
Patient's (Body mass index is 32.99 kg/m².) indicates that they are obese (BMI >30) with health conditions that include none . Weight is improving with lifestyle modifications. BMI  is above average; BMI management plan is completed. We discussed portion control and increasing exercise.

## 2024-04-17 NOTE — PROGRESS NOTES
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 male who presents today for follow-up of above chief complaint.    Patient is nearly finished radiation treatments for SCC of neck.  He is tolerating this extremely well.  Had temporary loss of taste and but this is already returning.  He is down nearly 30 pounds from last visit.  Feels great.  Has completely changed his diet and eliminated alcohol.  Has 3 more radiation treatments to complete his cycle.  Follows [facility] radiation oncology.    Leaves for training camp this weekend and would like letter to exempt from strenuous physical activity.     PE:  Vitals:    04/17/24 0911   BP: 136/80   Pulse: 80   SpO2: 97%      Body mass index is 32.99 kg/m².    Gen Appearance: NAD  HEENT: Normocephalic, EOMI, significant improvement in localized swelling of left submandibular region  Lungs: Normal WOB  MSK: Moves all extremities well, normal gait, no peripheral edema  Neuro: No focal deficits    Current Outpatient Medications   Medication Sig Dispense Refill    chlorhexidine (PERIDEX) 0.12 % solution Swish 15 mL (one capful) for thirty seconds then spit. Use twice daily. 118 mL 0    doxycycline (VIBRAMYCIN) 100 MG capsule       hydrOXYzine (ATARAX) 25 MG tablet Take 1 or 2 tablets every 8 hours as needed for anxiety. 60 tablet 2    prednisoLONE acetate (PRED FORTE) 1 % ophthalmic suspension INSTILL 1 DROP EVERY 4 HOURS IN RIGHT EYE      Valtrex 500 MG tablet        No current facility-administered medications for this visit.        A/P:  Diagnoses and all orders for this visit:    1. 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 Class 1 obesity without serious comorbidity with body mass index (BMI) of 32.0 to 32.9 in adult, unspecified obesity  type  Assessment & Plan:  Patient's (Body mass index is 32.99 kg/m².) indicates that they are obese (BMI >30) with health conditions that include none . Weight is improving with lifestyle modifications. BMI  is above average; BMI management plan is completed. We discussed portion control and increasing exercise.     FU in 3 months for annual    Dictated Utilizing Dragon Dictation    Please note that portions of this note were completed with a voice recognition program.    Part of this note may be an electronic transcription/translation of spoken language to printed text using the Dragon Dictation System.

## 2024-05-15 ENCOUNTER — PATIENT MESSAGE (OUTPATIENT)
Dept: FAMILY MEDICINE CLINIC | Facility: CLINIC | Age: 54
End: 2024-05-15
Payer: OTHER GOVERNMENT

## 2024-05-16 NOTE — TELEPHONE ENCOUNTER
From: Long Gleason  To: Karrie Eagn  Sent: 5/15/2024 2:18 PM EDT  Subject: Just a heads up....    I am having that same tingling sensation on my right temple and cheek area that usually happens when I have a HSV outbreak. Remember, this happened last Summer in August and resulted in that very disruptive eye infection. I have started taking the Antiviral Rx twice a day now and taking Lysine and other good natural anti virals.    I am really hoping this does not result in another eye infection.    Ancelmo    PS- My treatment at  is almost over. I will following up with [facility] group (Dr. Manrique) as I do not think that I want to follow up with  ENT at this point. They have been very hard to work with since January.

## 2024-05-23 ENCOUNTER — TELEPHONE (OUTPATIENT)
Dept: FAMILY MEDICINE CLINIC | Facility: CLINIC | Age: 54
End: 2024-05-23
Payer: OTHER GOVERNMENT

## 2024-05-23 NOTE — TELEPHONE ENCOUNTER
Caller: SUNIL([facility])    Relationship: Provider    Best call back number: [phone]       What specialty or service is being requested: EARS NOSE AND THROAT    What is the provider, practice or medical service name: [facility]  DR. RODNEY VARGAS  What is the office location: [place]     What is the office phone number: [phone]   FAX NUMBER [phone]  Any additional details: REFERRAL NEEDED DUE TO PATIENTS INSURANCE. PLEASE CALL AND ADVISE.

## 2024-05-24 ENCOUNTER — TELEPHONE (OUTPATIENT)
Dept: FAMILY MEDICINE CLINIC | Facility: CLINIC | Age: 54
End: 2024-05-24
Payer: OTHER GOVERNMENT

## 2024-05-24 NOTE — TELEPHONE ENCOUNTER
Caller: Long Gleason    Relationship: Self    Best call back number: [phone]     What is the best time to reach you: ANY    Who are you requesting to speak with (clinical staff, provider,  specific staff member): Karrie Egan, DO     What was the call regarding: PATIENT WOULD LIKE TO SPEAK WITH PCP BEFORE THE END OF THE DAY TO UPDATE HER ON HIS CURRENT TREATMENT     Is it okay if the provider responds through MyChart: NO

## 2024-06-26 ENCOUNTER — OFFICE VISIT (OUTPATIENT)
Dept: FAMILY MEDICINE CLINIC | Facility: CLINIC | Age: 54
End: 2024-06-26
Payer: OTHER GOVERNMENT

## 2024-06-26 VITALS
HEIGHT: 76 IN | HEART RATE: 75 BPM | SYSTOLIC BLOOD PRESSURE: 124 MMHG | OXYGEN SATURATION: 99 % | WEIGHT: 259 LBS | DIASTOLIC BLOOD PRESSURE: 78 MMHG | BODY MASS INDEX: 31.54 KG/M2

## 2024-06-26 DIAGNOSIS — J45.990 EXERCISE-INDUCED ASTHMA: ICD-10-CM

## 2024-06-26 DIAGNOSIS — E66.9 CLASS 1 OBESITY WITHOUT SERIOUS COMORBIDITY WITH BODY MASS INDEX (BMI) OF 32.0 TO 32.9 IN ADULT, UNSPECIFIED OBESITY TYPE: ICD-10-CM

## 2024-06-26 DIAGNOSIS — C77.0 SQUAMOUS CELL CARCINOMA METASTATIC TO LYMPH NODES OF HEAD AND NECK: ICD-10-CM

## 2024-06-26 DIAGNOSIS — R09.82 POST-NASAL DRIP: Primary | ICD-10-CM

## 2024-06-26 PROCEDURE — 99214 OFFICE O/P EST MOD 30 MIN: CPT | Performed by: STUDENT IN AN ORGANIZED HEALTH CARE EDUCATION/TRAINING PROGRAM

## 2024-06-26 RX ORDER — ALBUTEROL SULFATE 90 UG/1
2 AEROSOL, METERED RESPIRATORY (INHALATION) EVERY 4 HOURS PRN
Qty: 8 G | Refills: 2 | Status: SHIPPED | OUTPATIENT
Start: 2024-06-26

## 2024-06-26 RX ORDER — FLUTICASONE PROPIONATE 50 MCG
1 SPRAY, SUSPENSION (ML) NASAL DAILY
Qty: 16 ML | Refills: 2 | Status: SHIPPED | OUTPATIENT
Start: 2024-06-26

## 2024-06-26 NOTE — PROGRESS NOTES
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 male with SCC of head/neck (unknown primary) metastatic to lymph node s/p radiation who presents today for follow up.     Followed up with radiation oncologist earlier this month.  He has now completed radiation treatments.  Continues to experience dry mouth and hoarseness.  Providers have told him these are likely secondary to radiation treatment  As a persistent dry cough.  Constant throughout the day.  Feels like the phlegm is stuck in his throat.  Thinks this may be contributing to the hoarseness.  Continues to lose weight, eat healthy and get regular physical activity.  Feels the best he has felt in years.  Denies symptoms of acid reflux.  Has cut out wine which made a big difference for him.  Does have some nasal congestion occasionally.  Takes hydroxyzine at night as needed.  PE:  Vitals:    06/26/24 1342   BP: 124/78   Pulse: 75   SpO2: 99%      Body mass index is 31.53 kg/m².    Gen Appearance: NAD  HEENT: Normocephalic, minimal clear fluid noted behind bilateral TM's  Heart: RRR, normal S1 and S2, no murmur  Lungs: CTA b/l, no wheezing, no crackles  MSK: Moves all extremities well, normal gait, no peripheral edema  Neuro: No focal deficits    Current Outpatient Medications   Medication Sig Dispense Refill    chlorhexidine (PERIDEX) 0.12 % solution Swish 15 mL (one capful) for thirty seconds then spit. Use twice daily. 118 mL 0    doxycycline (VIBRAMYCIN) 100 MG capsule       hydrOXYzine (ATARAX) 25 MG tablet Take 1 or 2 tablets every 8 hours as needed for anxiety. 60 tablet 2    prednisoLONE acetate (PRED FORTE) 1 % ophthalmic suspension INSTILL 1 DROP EVERY 4 HOURS IN RIGHT EYE      Valtrex 500 MG tablet       albuterol sulfate  (90 Base) MCG/ACT inhaler Inhale 2 puffs Every 4 (Four) Hours As Needed  for Wheezing. 8 g 2    fluticasone (FLONASE) 50 MCG/ACT nasal spray 1 spray into the nostril(s) as directed by provider Daily. 16 mL 2     No current facility-administered medications for this visit.        A/P:  Diagnoses and all orders for this visit:    1. Post-nasal drip (Primary)  Discussed cough could possibly be related to postnasal drainage.  He is already taking hydroxyzine.  Cautioned that this may be worsening his dry mouth.  Advised starting daily Flonase.  Instructed on proper use.  Discussed other differentials for chronic cough including silent reflux although patient denies any other symptoms concerning for this.  Cannot completely rule out that this is a radiation-induced side effect.  -     fluticasone (FLONASE) 50 MCG/ACT nasal spray; 1 spray into the nostril(s) as directed by provider Daily.  Dispense: 16 mL; Refill: 2    2. Exercise-induced asthma  Rare use of albuterol inhaler, reports 1-2 times yearly.  Refill sent.  -     albuterol sulfate  (90 Base) MCG/ACT inhaler; Inhale 2 puffs Every 4 (Four) Hours As Needed for Wheezing.  Dispense: 8 g; Refill: 2    3. 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    Part of this note may be an electronic transcription/translation of spoken language to printed text using the Dragon Dictation System.

## 2024-07-05 ENCOUNTER — TRANSCRIBE ORDERS (OUTPATIENT)
Dept: ADMINISTRATIVE | Facility: HOSPITAL | Age: 54
End: 2024-07-05
Payer: OTHER GOVERNMENT

## 2024-07-05 DIAGNOSIS — C79.89 MALIGNANT NEOPLASM METASTATIC TO NECK WITH UNKNOWN PRIMARY SITE: Primary | ICD-10-CM

## 2024-07-05 DIAGNOSIS — C79.89 METASTATIC SQUAMOUS CELL CARCINOMA TO HEAD AND NECK: ICD-10-CM

## 2024-07-05 DIAGNOSIS — C80.1 MALIGNANT NEOPLASM METASTATIC TO NECK WITH UNKNOWN PRIMARY SITE: Primary | ICD-10-CM

## 2024-08-09 ENCOUNTER — HOSPITAL ENCOUNTER (OUTPATIENT)
Dept: CT IMAGING | Facility: HOSPITAL | Age: 54
Discharge: HOME | End: 2024-08-09
Admitting: STUDENT IN AN ORGANIZED HEALTH CARE EDUCATION/TRAINING PROGRAM
Payer: OTHER GOVERNMENT

## 2024-08-09 DIAGNOSIS — C79.89 MALIGNANT NEOPLASM METASTATIC TO NECK WITH UNKNOWN PRIMARY SITE: ICD-10-CM

## 2024-08-09 DIAGNOSIS — C79.89 METASTATIC SQUAMOUS CELL CARCINOMA TO HEAD AND NECK: ICD-10-CM

## 2024-08-09 DIAGNOSIS — C80.1 MALIGNANT NEOPLASM METASTATIC TO NECK WITH UNKNOWN PRIMARY SITE: ICD-10-CM

## 2024-08-09 PROCEDURE — 25510000001 IOPAMIDOL 61 % SOLUTION: Performed by: STUDENT IN AN ORGANIZED HEALTH CARE EDUCATION/TRAINING PROGRAM

## 2024-08-09 PROCEDURE — 70491 CT SOFT TISSUE NECK W/DYE: CPT

## 2024-08-09 RX ADMIN — IOPAMIDOL 90 ML: 612 INJECTION, SOLUTION INTRAVENOUS at 08:56

## 2024-09-06 ENCOUNTER — TRANSCRIBE ORDERS (OUTPATIENT)
Dept: ADMINISTRATIVE | Facility: HOSPITAL | Age: 54
End: 2024-09-06
Payer: OTHER GOVERNMENT

## 2024-09-06 DIAGNOSIS — C79.89 METASTATIC MELANOMA TO HEAD AND NECK: Primary | ICD-10-CM

## 2024-09-19 ENCOUNTER — HOSPITAL ENCOUNTER (OUTPATIENT)
Dept: PET IMAGING | Facility: HOSPITAL | Age: 54
Discharge: HOME | End: 2024-09-19
Payer: OTHER GOVERNMENT

## 2024-09-19 DIAGNOSIS — C79.89 METASTATIC MELANOMA TO HEAD AND NECK: ICD-10-CM

## 2024-09-19 LAB — GLUCOSE BLDC GLUCOMTR-MCNC: 93 MG/DL (ref 70–130)

## 2024-09-19 PROCEDURE — 0 FLUDEOXYGLUCOSE F18 SOLUTION: Performed by: STUDENT IN AN ORGANIZED HEALTH CARE EDUCATION/TRAINING PROGRAM

## 2024-09-19 PROCEDURE — A9552 F18 FDG: HCPCS | Performed by: STUDENT IN AN ORGANIZED HEALTH CARE EDUCATION/TRAINING PROGRAM

## 2024-09-19 PROCEDURE — 78815 PET IMAGE W/CT SKULL-THIGH: CPT

## 2024-09-19 PROCEDURE — 82948 REAGENT STRIP/BLOOD GLUCOSE: CPT

## 2024-09-19 RX ADMIN — FLUDEOXYGLUCOSE F 18 1 DOSE: 200 INJECTION, SOLUTION INTRAVENOUS at 09:10

## 2024-10-02 ENCOUNTER — OFFICE VISIT (OUTPATIENT)
Dept: FAMILY MEDICINE CLINIC | Facility: CLINIC | Age: 54
End: 2024-10-02
Payer: OTHER GOVERNMENT

## 2024-10-02 VITALS
BODY MASS INDEX: 32.51 KG/M2 | SYSTOLIC BLOOD PRESSURE: 126 MMHG | DIASTOLIC BLOOD PRESSURE: 84 MMHG | HEIGHT: 76 IN | OXYGEN SATURATION: 98 % | WEIGHT: 267 LBS | HEART RATE: 88 BPM

## 2024-10-02 DIAGNOSIS — C77.0 SQUAMOUS CELL CARCINOMA METASTATIC TO LYMPH NODES OF HEAD AND NECK: Primary | ICD-10-CM

## 2024-10-02 DIAGNOSIS — F43.9 STRESS AT HOME: ICD-10-CM

## 2024-10-02 DIAGNOSIS — R09.82 POST-NASAL DRIP: ICD-10-CM

## 2024-10-02 DIAGNOSIS — C44.92 SQUAMOUS CELL CARCINOMA METASTATIC TO LYMPH NODES OF HEAD AND NECK: Primary | ICD-10-CM

## 2024-10-02 PROCEDURE — 99214 OFFICE O/P EST MOD 30 MIN: CPT | Performed by: STUDENT IN AN ORGANIZED HEALTH CARE EDUCATION/TRAINING PROGRAM

## 2024-10-08 NOTE — PROGRESS NOTES
Chief Complaint   Patient presents with    Post-nasal drip       HPI:  Long Gleason is a 54 y.o. male with h/o metastatic squamous cell carcinoma of left neck who presents today for follow up.     Pt has been doing well since last visit.  Followed up with Oncology, recent scans show no recurrence in disease. Has follow up again in January and they are planning for nasal endoscopy at that time. Still having some occasional discomfort around the neck area, assumes this to be side effect of his treatment.     Still having some issues with post nasal drainage. Using flonase for this.     Stressed mostly about his partner's health. Expresses frustration about her lack of motivation to help care for herself. States this is really the only stressor in his life.   Continues to eat a healthy diet, get regular physical activity.      PE:  Vitals:    10/02/24 1305   BP: 126/84   Pulse: 88   SpO2: 98%      Body mass index is 32.5 kg/m².    Gen Appearance: NAD  HEENT: Normocephalic, EOMI  Heart: RRR, normal S1 and S2, no murmur  Lungs: CTA b/l, no wheezing, no crackles  MSK: Moves all extremities well, normal gait, no peripheral edema  Neuro: No focal deficits    Current Outpatient Medications   Medication Sig Dispense Refill    albuterol sulfate  (90 Base) MCG/ACT inhaler Inhale 2 puffs Every 4 (Four) Hours As Needed for Wheezing. 8 g 2    chlorhexidine (PERIDEX) 0.12 % solution Swish 15 mL (one capful) for thirty seconds then spit. Use twice daily. 118 mL 0    doxycycline (VIBRAMYCIN) 100 MG capsule       fluticasone (FLONASE) 50 MCG/ACT nasal spray 1 spray into the nostril(s) as directed by provider Daily. 16 mL 2    hydrOXYzine (ATARAX) 25 MG tablet Take 1 or 2 tablets every 8 hours as needed for anxiety. 60 tablet 2    prednisoLONE acetate (PRED FORTE) 1 % ophthalmic suspension INSTILL 1 DROP EVERY 4 HOURS IN RIGHT EYE      Valtrex 500 MG tablet        No current facility-administered medications for this  visit.        A/P:  Diagnoses and all orders for this visit:    1. 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 Post-nasal drip  Cont flonase    3. Stress at home  Spent several minutes discussing stress, triggers and ways he can provide support to partner. FU w me as needed for this. Could benefit from counseling.       Return in about 6 months (around 4/2/2025) for Annual 30 min.     Dictated Utilizing Dragon Dictation    Please note that portions of this note were completed with a voice recognition program.    Part of this note may be an electronic transcription/translation of spoken language to printed text using the Dragon Dictation System.

## 2025-03-26 ENCOUNTER — OFFICE VISIT (OUTPATIENT)
Dept: FAMILY MEDICINE CLINIC | Facility: CLINIC | Age: 55
End: 2025-03-26
Payer: OTHER GOVERNMENT

## 2025-03-26 VITALS
BODY MASS INDEX: 33.61 KG/M2 | WEIGHT: 276 LBS | HEIGHT: 76 IN | OXYGEN SATURATION: 100 % | DIASTOLIC BLOOD PRESSURE: 78 MMHG | SYSTOLIC BLOOD PRESSURE: 124 MMHG | HEART RATE: 96 BPM

## 2025-03-26 DIAGNOSIS — Z12.11 COLON CANCER SCREENING: ICD-10-CM

## 2025-03-26 DIAGNOSIS — E03.9 ACQUIRED HYPOTHYROIDISM: ICD-10-CM

## 2025-03-26 DIAGNOSIS — Z12.5 SCREENING FOR MALIGNANT NEOPLASM OF PROSTATE: ICD-10-CM

## 2025-03-26 DIAGNOSIS — Z00.00 ANNUAL PHYSICAL EXAM: Primary | ICD-10-CM

## 2025-03-26 DIAGNOSIS — R79.89 ELEVATED TSH: ICD-10-CM

## 2025-03-26 NOTE — PROGRESS NOTES
Male Physical Note      Date: 2025   Patient Name: Long Gleason  : 1970   MRN: [id number]     Chief Complaint:    Chief Complaint   Patient presents with    Annual Exam       History of Present Illness: Long Gleason is a 54 y.o. male who is here today for their annual health maintenance and physical.    HPI  Saw ENT last week for FU of squamous cell cancer. Had previous negative PET scan after treatment. Has FU in 4 months w CT neck/chest for surveillance. Had blood work drawn and pt states he requested that thyroid labs be drawn.   TSH was elevated at 8.15, Free T4 was normal. He was prescribed levothyroxine 25mcg but is hesitant to take this. Would like to know if he can treat with diet, supplementation.  Denies dry skin/texture changes, no excessive fatigue or feeling sluggish, no constipation.   He has been struggling with weight gain. Today he states he is back to pre-radiation treatment weight, but not to the heaviest weight he's been.(290). Has been taking a lot of biotin and tesosterone, wondering if that could affect thyroid function.    He takes magnesium, L thionine and melatonin for sleep. Will take valerian root on weekend.     Unknown family history, adopted.     NO previous colon cancer screening.   Would like blood work drawn.   Declines vaccinations. Would like to read more about these.         Subjective      Review of Systems:   Review of Systems   Constitutional:  Negative for appetite change.   HENT:  Negative for sore throat.    Respiratory:  Negative for shortness of breath.    Cardiovascular:  Negative for chest pain and palpitations.   Gastrointestinal:  Negative for abdominal pain, constipation and diarrhea.   Genitourinary:  Negative for dysuria.   Musculoskeletal:  Negative for arthralgias and joint swelling.   Neurological:  Negative for syncope and headache.   Psychiatric/Behavioral:  Negative for sleep disturbance.        Past Medical History, Social  History, Family History and Care Team were all reviewed with patient and updated as appropriate.     Medications:     Current Outpatient Medications:     albuterol sulfate  (90 Base) MCG/ACT inhaler, Inhale 2 puffs Every 4 (Four) Hours As Needed for Wheezing., Disp: 8 g, Rfl: 2    chlorhexidine (PERIDEX) 0.12 % solution, Swish 15 mL (one capful) for thirty seconds then spit. Use twice daily., Disp: 118 mL, Rfl: 0    fluticasone (FLONASE) 50 MCG/ACT nasal spray, 1 spray into the nostril(s) as directed by provider Daily., Disp: 16 mL, Rfl: 2    hydrOXYzine (ATARAX) 25 MG tablet, Take 1 or 2 tablets every 8 hours as needed for anxiety., Disp: 60 tablet, Rfl: 2    prednisoLONE acetate (PRED FORTE) 1 % ophthalmic suspension, INSTILL 1 DROP EVERY 4 HOURS IN RIGHT EYE, Disp: , Rfl:     Valtrex 500 MG tablet, , Disp: , Rfl:     Allergies:   Allergies   Allergen Reactions    Latex Anaphylaxis    Sulfa Antibiotics Anaphylaxis    Penicillins Unknown (See Comments)     Pt will explain    Lactose Diarrhea       Immunizations:  Td/Tdap(Booster Q 10 yrs):  Had Tdap within last 10 years   Flu (Yearly):  Declined  Pneumonia: Declines  Immunization History   Administered Date(s) Administered    Influenza, Unspecified 11/21/2013        Colorectal Screening:   Ordered Cologuard  Last Completed Colonoscopy    This patient has no relevant Health Maintenance data.       CT for Smoker (Age 50-80, 20pk yr within last 15 years):  Does not qualify  AAA Screen (Age 65+): Will need at 65  HIV (Age 15-65 once) : Previously negative  PSA (Over age 50, C Level Recommendation):  Ordered  A1c: Ordered  Lipid panel: Ordered  The ASCVD Risk score (Jackelin TAPIA, et al., 2019) failed to calculate for the following reasons:    Cannot find a previous HDL lab    Cannot find a previous total cholesterol lab    Dermatology: Saw one in 2023  Vision: UTD  Dentist: Regular visits    Tobacco Use: Medium Risk (3/26/2025)    Patient History     Smoking Tobacco  "Use: Former     Smokeless Tobacco Use: Never     Passive Exposure: Not on file       Social History     Substance and Sexual Activity   Alcohol Use Yes        Social History     Substance and Sexual Activity   Drug Use No        Diet/Physical activity: Fairly healthy, regular physical activity    Objective     Physical Exam:  Vital Signs:   Vitals:    03/26/25 1235   BP: 124/78   BP Location: Left arm   Patient Position: Sitting   Cuff Size: Adult   Pulse: 96   SpO2: 100%   Weight: 125 kg (276 lb)   Height: 193 cm (76\")     Body mass index is 33.6 kg/m².     Physical Exam  Vitals reviewed.   Constitutional:       General: He is not in acute distress.     Appearance: Normal appearance. He is obese.   HENT:      Head: Normocephalic and atraumatic.      Right Ear: Tympanic membrane normal.      Left Ear: Tympanic membrane normal.      Nose: Nose normal.      Mouth/Throat:      Mouth: Mucous membranes are moist.      Pharynx: Oropharynx is clear. No oropharyngeal exudate or posterior oropharyngeal erythema.   Eyes:      Extraocular Movements: Extraocular movements intact.      Conjunctiva/sclera: Conjunctivae normal.      Pupils: Pupils are equal, round, and reactive to light.   Cardiovascular:      Rate and Rhythm: Normal rate and regular rhythm.      Heart sounds: Normal heart sounds. No murmur heard.  Pulmonary:      Effort: Pulmonary effort is normal.      Breath sounds: No wheezing.   Abdominal:      General: Abdomen is flat. Bowel sounds are normal.      Palpations: Abdomen is soft. There is no mass.      Tenderness: There is no abdominal tenderness.   Musculoskeletal:         General: Normal range of motion.      Cervical back: Normal range of motion and neck supple.   Lymphadenopathy:      Cervical: No cervical adenopathy.   Neurological:      General: No focal deficit present.      Mental Status: He is alert.   Psychiatric:         Mood and Affect: Mood normal.         Thought Content: Thought content normal. "             Assessment / Plan      Assessment/Plan:   Assessment & Plan  Annual physical exam  Age appropriate screenings and recommendations reviewed  Orders:    Comprehensive Metabolic Panel; Future    CBC & Differential; Future    Lipid Panel With / Chol / HDL Ratio; Future    Hemoglobin A1c; Future    Acquired hypothyroidism  Recent labs show elevated TSH to 8.15, normal T4  PT has been taking supplement w biotin- advised to hold this and recheck in 5-7 days  Will check TPO ab's as well to different between autoimmune hypothyroidism. Discussed this could also be from his cancer treatment.  Orders:    TSH; Future    T4, free; Future    Thyroid Peroxidase Antibody; Future    Elevated TSH    Orders:    TSH; Future    T4, free; Future    Thyroid Peroxidase Antibody; Future    Colon cancer screening    Orders:    Cologuard - Stool, Per Rectum; Future    Screening for malignant neoplasm of prostate    Orders:    PSA Screen; Future        Healthcare Maintenance:  Counseling provided based on age appropriate USPSTF guidelines. Preventive counseling and anticipatory guidance discussed on the following topics: nutrition, physical activity, healthy weight, dental health, mental health, immunizations, and screenings    Long Castro Rosibel voices understanding and acceptance of this advice and will call back with any further questions or concerns. AVS with preventive healthcare tips printed for patient.         Follow Up:   Return if symptoms worsen or fail to improve.          Karrie Egan DO  [facility] [address]

## 2025-03-26 NOTE — PROGRESS NOTES
Chief Complaint   Patient presents with   • Annual Exam       HPI:  Long Gleason is a 54 y.o. male with h/o metastatic squamous cell carcinoma of left neck who presents today for annual physical.    Pt has been doing well since last visit.  Followed up with Oncology, recent scans show no recurrence in disease. Has follow up again in January and they are planning for nasal endoscopy at that time. Still having some occasional discomfort around the neck area, assumes this to be side effect of his treatment.      Still having some issues with post nasal drainage. Using flonase for this.      Stressed mostly about his partner's health. Expresses frustration about her lack of motivation to help care for herself. States this is really the only stressor in his life.   Continues to eat a healthy diet, get regular physical activity.    ----------------------------        PE:  There were no vitals filed for this visit.   Body mass index is 32.5 kg/m².    Physical Exam      Current Outpatient Medications   Medication Sig Dispense Refill   • albuterol sulfate  (90 Base) MCG/ACT inhaler Inhale 2 puffs Every 4 (Four) Hours As Needed for Wheezing. 8 g 2   • chlorhexidine (PERIDEX) 0.12 % solution Swish 15 mL (one capful) for thirty seconds then spit. Use twice daily. 118 mL 0   • doxycycline (VIBRAMYCIN) 100 MG capsule      • fluticasone (FLONASE) 50 MCG/ACT nasal spray 1 spray into the nostril(s) as directed by provider Daily. 16 mL 2   • hydrOXYzine (ATARAX) 25 MG tablet Take 1 or 2 tablets every 8 hours as needed for anxiety. 60 tablet 2   • prednisoLONE acetate (PRED FORTE) 1 % ophthalmic suspension INSTILL 1 DROP EVERY 4 HOURS IN RIGHT EYE     • Valtrex 500 MG tablet        No current facility-administered medications for this visit.        A/P:  Assessment & Plan  Acquired hypothyroidism             No follow-ups on file.

## 2025-04-22 ENCOUNTER — LAB (OUTPATIENT)
Dept: LAB | Facility: HOSPITAL | Age: 55
End: 2025-04-22
Payer: OTHER GOVERNMENT

## 2025-04-22 DIAGNOSIS — E03.9 ACQUIRED HYPOTHYROIDISM: ICD-10-CM

## 2025-04-22 DIAGNOSIS — Z12.5 SCREENING FOR MALIGNANT NEOPLASM OF PROSTATE: ICD-10-CM

## 2025-04-22 DIAGNOSIS — Z00.00 ANNUAL PHYSICAL EXAM: ICD-10-CM

## 2025-04-22 DIAGNOSIS — E66.811 CLASS 1 OBESITY WITHOUT SERIOUS COMORBIDITY WITH BODY MASS INDEX (BMI) OF 32.0 TO 32.9 IN ADULT, UNSPECIFIED OBESITY TYPE: ICD-10-CM

## 2025-04-22 DIAGNOSIS — R79.89 ELEVATED TSH: ICD-10-CM

## 2025-04-22 LAB
ALBUMIN SERPL-MCNC: 4.5 G/DL (ref 3.5–5.2)
ALBUMIN/GLOB SERPL: 2 G/DL
ALP SERPL-CCNC: 112 U/L (ref 39–117)
ALT SERPL W P-5'-P-CCNC: 11 U/L (ref 1–41)
ANION GAP SERPL CALCULATED.3IONS-SCNC: 10.7 MMOL/L (ref 5–15)
AST SERPL-CCNC: 18 U/L (ref 1–40)
BASOPHILS # BLD AUTO: 0.02 10*3/MM3 (ref 0–0.2)
BASOPHILS NFR BLD AUTO: 0.5 % (ref 0–1.5)
BILIRUB SERPL-MCNC: 0.6 MG/DL (ref 0–1.2)
BUN SERPL-MCNC: 16 MG/DL (ref 6–20)
BUN/CREAT SERPL: 14.2 (ref 7–25)
CALCIUM SPEC-SCNC: 9.8 MG/DL (ref 8.6–10.5)
CHLORIDE SERPL-SCNC: 104 MMOL/L (ref 98–107)
CHOLEST SERPL-MCNC: 200 MG/DL (ref 0–200)
CO2 SERPL-SCNC: 25.3 MMOL/L (ref 22–29)
CREAT SERPL-MCNC: 1.13 MG/DL (ref 0.76–1.27)
DEPRECATED RDW RBC AUTO: 49.2 FL (ref 37–54)
EGFRCR SERPLBLD CKD-EPI 2021: 76.3 ML/MIN/1.73
EOSINOPHIL # BLD AUTO: 0.06 10*3/MM3 (ref 0–0.4)
EOSINOPHIL NFR BLD AUTO: 1.4 % (ref 0.3–6.2)
ERYTHROCYTE [DISTWIDTH] IN BLOOD BY AUTOMATED COUNT: 13.9 % (ref 12.3–15.4)
GLOBULIN UR ELPH-MCNC: 2.3 GM/DL
GLUCOSE SERPL-MCNC: 92 MG/DL (ref 65–99)
HBA1C MFR BLD: 5.6 % (ref 4.8–5.6)
HCT VFR BLD AUTO: 45.9 % (ref 37.5–51)
HDLC SERPL QL: 3.23
HDLC SERPL-MCNC: 62 MG/DL (ref 40–60)
HGB BLD-MCNC: 15.3 G/DL (ref 13–17.7)
IMM GRANULOCYTES # BLD AUTO: 0.01 10*3/MM3 (ref 0–0.05)
IMM GRANULOCYTES NFR BLD AUTO: 0.2 % (ref 0–0.5)
LDLC SERPL CALC-MCNC: 128 MG/DL (ref 0–100)
LYMPHOCYTES # BLD AUTO: 1.05 10*3/MM3 (ref 0.7–3.1)
LYMPHOCYTES NFR BLD AUTO: 24.6 % (ref 19.6–45.3)
MCH RBC QN AUTO: 31.9 PG (ref 26.6–33)
MCHC RBC AUTO-ENTMCNC: 33.3 G/DL (ref 31.5–35.7)
MCV RBC AUTO: 95.8 FL (ref 79–97)
MONOCYTES # BLD AUTO: 0.33 10*3/MM3 (ref 0.1–0.9)
MONOCYTES NFR BLD AUTO: 7.7 % (ref 5–12)
NEUTROPHILS NFR BLD AUTO: 2.79 10*3/MM3 (ref 1.7–7)
NEUTROPHILS NFR BLD AUTO: 65.6 % (ref 42.7–76)
NRBC BLD AUTO-RTO: 0 /100 WBC (ref 0–0.2)
PLATELET # BLD AUTO: 240 10*3/MM3 (ref 140–450)
PMV BLD AUTO: 9.8 FL (ref 6–12)
POTASSIUM SERPL-SCNC: 4.8 MMOL/L (ref 3.5–5.2)
PROT SERPL-MCNC: 6.8 G/DL (ref 6–8.5)
PSA SERPL-MCNC: 0.63 NG/ML (ref 0–4)
RBC # BLD AUTO: 4.79 10*6/MM3 (ref 4.14–5.8)
SODIUM SERPL-SCNC: 140 MMOL/L (ref 136–145)
T4 FREE SERPL-MCNC: 1.15 NG/DL (ref 0.92–1.68)
TRIGL SERPL-MCNC: 54 MG/DL (ref 0–150)
TSH SERPL DL<=0.05 MIU/L-ACNC: 5.73 UIU/ML (ref 0.27–4.2)
VLDLC SERPL-MCNC: 10 MG/DL (ref 5–40)
WBC NRBC COR # BLD AUTO: 4.26 10*3/MM3 (ref 3.4–10.8)

## 2025-04-22 PROCEDURE — 80061 LIPID PANEL: CPT

## 2025-04-22 PROCEDURE — 85025 COMPLETE CBC W/AUTO DIFF WBC: CPT

## 2025-04-22 PROCEDURE — 84443 ASSAY THYROID STIM HORMONE: CPT

## 2025-04-22 PROCEDURE — 80053 COMPREHEN METABOLIC PANEL: CPT

## 2025-04-22 PROCEDURE — 86376 MICROSOMAL ANTIBODY EACH: CPT

## 2025-04-22 PROCEDURE — 83036 HEMOGLOBIN GLYCOSYLATED A1C: CPT

## 2025-04-22 PROCEDURE — 84439 ASSAY OF FREE THYROXINE: CPT

## 2025-04-22 PROCEDURE — G0103 PSA SCREENING: HCPCS

## 2025-04-23 LAB — THYROPEROXIDASE AB SERPL-ACNC: 11 IU/ML (ref 0–34)

## 2025-05-02 ENCOUNTER — OFFICE VISIT (OUTPATIENT)
Dept: FAMILY MEDICINE CLINIC | Facility: CLINIC | Age: 55
End: 2025-05-02
Payer: OTHER GOVERNMENT

## 2025-05-02 VITALS
BODY MASS INDEX: 32.88 KG/M2 | WEIGHT: 270 LBS | HEART RATE: 88 BPM | OXYGEN SATURATION: 97 % | DIASTOLIC BLOOD PRESSURE: 74 MMHG | HEIGHT: 76 IN | SYSTOLIC BLOOD PRESSURE: 118 MMHG

## 2025-05-02 DIAGNOSIS — E78.5 DYSLIPIDEMIA, GOAL LDL BELOW 100: ICD-10-CM

## 2025-05-02 DIAGNOSIS — R79.89 ABNORMAL TSH: Primary | ICD-10-CM

## 2025-05-02 PROCEDURE — 99214 OFFICE O/P EST MOD 30 MIN: CPT | Performed by: STUDENT IN AN ORGANIZED HEALTH CARE EDUCATION/TRAINING PROGRAM

## 2025-05-02 NOTE — PROGRESS NOTES
Chief Complaint   Patient presents with    Results     Follow up going over lab work        HPI:  Long Gleason is a 54 y.o. male who presents today for lab review.     Had labs drawn last week.   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   He opted not to start the medication and instead has been trying some dietary changes and OTC supplements that he has researched online.   Repeat TSH improved to 5, T4 is normal range. He denies any overt hypothyroid symptoms.     LDL was mildly elevated to 128, HDL was 62.   Has been working on healthier eating habits- weight is down 6 lbs from his visit last month.     PE:  Vitals:    05/02/25 1257   BP: 118/74   Pulse: 88   SpO2: 97%      Body mass index is 32.87 kg/m².    Gen Appearance: NAD  HEENT: Normocephalic, EOMI, no thyromegaly, trachea midline  Lungs: Normal WOB  MSK: Moves all extremities well, normal gait, no peripheral edema  Neuro: No focal deficits    Current Outpatient Medications   Medication Sig Dispense Refill    albuterol sulfate  (90 Base) MCG/ACT inhaler Inhale 2 puffs Every 4 (Four) Hours As Needed for Wheezing. 8 g 2    chlorhexidine (PERIDEX) 0.12 % solution Swish 15 mL (one capful) for thirty seconds then spit. Use twice daily. 118 mL 0    fluticasone (FLONASE) 50 MCG/ACT nasal spray 1 spray into the nostril(s) as directed by provider Daily. 16 mL 2    hydrOXYzine (ATARAX) 25 MG tablet Take 1 or 2 tablets every 8 hours as needed for anxiety. 60 tablet 2    prednisoLONE acetate (PRED FORTE) 1 % ophthalmic suspension INSTILL 1 DROP EVERY 4 HOURS IN RIGHT EYE      Valtrex 500 MG tablet        No current facility-administered medications for this visit.        A/P:  Assessment & Plan  Abnormal TSH  Likely subclinical hypothyroidism  Essentially asymptomatic. Not on thyroid  supplementation.  Cont to monitor serial TSH levels. Pt taking supplements to support thyroid help. Briefly review autoimmune diet which may help regulate levels as well.       Dyslipidemia, goal LDL below 100  Lab Results   Component Value Date    CHOL 200 04/22/2025    TRIG 54 04/22/2025    HDL 62 (H) 04/22/2025     (H) 04/22/2025     ASCVD risk 4.4%  Pt to work on conservative ways to lower lipid levels with diet/exercise  Repeat in 3-6 months         FU in 6 months to est with new PCP    Dictated Utilizing Dragon Dictation    Please note that portions of this note were completed with a voice recognition program.    Part of this note may be an electronic transcription/translation of spoken language to printed text using the Dragon Dictation System.

## 2025-06-12 ENCOUNTER — TRANSCRIBE ORDERS (OUTPATIENT)
Dept: ADMINISTRATIVE | Facility: HOSPITAL | Age: 55
End: 2025-06-12
Payer: OTHER GOVERNMENT

## 2025-06-12 DIAGNOSIS — C79.89 SECONDARY SQUAMOUS CELL CARCINOMA OF HEAD AND NECK: Primary | ICD-10-CM

## 2025-06-13 ENCOUNTER — OFFICE VISIT (OUTPATIENT)
Dept: FAMILY MEDICINE CLINIC | Facility: CLINIC | Age: 55
End: 2025-06-13
Payer: OTHER GOVERNMENT

## 2025-06-13 VITALS
BODY MASS INDEX: 32.73 KG/M2 | HEIGHT: 76 IN | HEART RATE: 87 BPM | OXYGEN SATURATION: 98 % | WEIGHT: 268.8 LBS | DIASTOLIC BLOOD PRESSURE: 80 MMHG | SYSTOLIC BLOOD PRESSURE: 124 MMHG

## 2025-06-13 DIAGNOSIS — J45.990 EXERCISE-INDUCED ASTHMA: ICD-10-CM

## 2025-06-13 DIAGNOSIS — C44.92 SQUAMOUS CELL CARCINOMA METASTATIC TO LYMPH NODES OF HEAD AND NECK: ICD-10-CM

## 2025-06-13 DIAGNOSIS — F41.8 SITUATIONAL ANXIETY: ICD-10-CM

## 2025-06-13 DIAGNOSIS — R79.89 ABNORMAL TSH: Primary | ICD-10-CM

## 2025-06-13 DIAGNOSIS — C77.0 SQUAMOUS CELL CARCINOMA METASTATIC TO LYMPH NODES OF HEAD AND NECK: ICD-10-CM

## 2025-06-13 PROCEDURE — 99214 OFFICE O/P EST MOD 30 MIN: CPT | Performed by: STUDENT IN AN ORGANIZED HEALTH CARE EDUCATION/TRAINING PROGRAM

## 2025-06-13 RX ORDER — MELOXICAM 7.5 MG/1
7.5-15 TABLET ORAL DAILY PRN
Qty: 60 TABLET | Refills: 2 | Status: SHIPPED | OUTPATIENT
Start: 2025-06-13

## 2025-06-13 RX ORDER — HYDROXYZINE HYDROCHLORIDE 25 MG/1
TABLET, FILM COATED ORAL
Qty: 60 TABLET | Refills: 2 | Status: SHIPPED | OUTPATIENT
Start: 2025-06-13

## 2025-06-13 RX ORDER — ALBUTEROL SULFATE 90 UG/1
2 INHALANT RESPIRATORY (INHALATION) EVERY 4 HOURS PRN
Qty: 8 G | Refills: 3 | Status: SHIPPED | OUTPATIENT
Start: 2025-06-13

## 2025-06-14 NOTE — PROGRESS NOTES
Chief Complaint   Patient presents with    Abnormal TSH     Follow up        HPI:  Long Gleason is a 54 y.o. 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    Had repeat thyroid labs drawn in April which showed improved TSH, now back to near normal range. He was rx'd levothyroxine by his ENT at  but decided not to take this. T4 has been normal, TPO antibodies were normal as well. He has changed his diet slightly and believes this is helping. No overt hypo or hyperthyroid symptoms.     Has been recommended to get q6 month MRI's for surveillance of his cancer by Oncology but unsure if he wants to do this.     Requesting refill of hydroxyzine and albuterol inhaler.       PE:  Vitals:    06/13/25 0900   BP: 124/80   Pulse: 87   SpO2: 98%      Body mass index is 32.73 kg/m².    Gen Appearance: NAD  HEENT: Normocephalic, EOMI  Heart: RRR, normal S1 and S2, no murmur  Lungs: CTA b/l, no wheezing, no crackles  MSK: Moves all extremities well, normal gait, no peripheral edema  Neuro: No focal deficits    Current Outpatient Medications   Medication Sig Dispense Refill    albuterol sulfate  (90 Base) MCG/ACT inhaler Inhale 2 puffs Every 4 (Four) Hours As Needed for Wheezing. 8 g 3    chlorhexidine (PERIDEX) 0.12 % solution Swish 15 mL (one capful) for thirty seconds then spit. Use twice daily. 118 mL 0    fluticasone (FLONASE) 50 MCG/ACT nasal spray 1 spray into the nostril(s) as directed by provider Daily. 16 mL 2    hydrOXYzine (ATARAX) 25 MG tablet Take 1 or 2 tablets every 8 hours as needed for anxiety. 60 tablet 2    prednisoLONE acetate (PRED FORTE) 1 % ophthalmic suspension INSTILL 1 DROP EVERY 4 HOURS IN RIGHT EYE      Valtrex 500 MG tablet       meloxicam (MOBIC) 7.5 MG tablet Take 1-2 tablets by mouth Daily As Needed for Moderate Pain. 60 tablet 2     No current facility-administered medications for this visit.         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 Pt is unsure if he wants to follow through with the recommended q6 month MRIs.        Exercise-induced asthma  Requests refill on Albuterol, rare use PRN    Orders:    albuterol sulfate  (90 Base) MCG/ACT inhaler; Inhale 2 puffs Every 4 (Four) Hours As Needed for Wheezing.    Situational anxiety  Takes for both situational anxiety and allergies, refill sent.  Orders:    hydrOXYzine (ATARAX) 25 MG tablet; Take 1 or 2 tablets every 8 hours as needed for anxiety.    FU in 4-6 months to est with new PCP    Dictated Utilizing Dragon Dictation    Please note that portions of this note were completed with a voice recognition program.    Part of this note may be an electronic transcription/translation of spoken language to printed text using the Dragon Dictation System.

## 2025-06-14 NOTE — ASSESSMENT & PLAN NOTE
Follows w [facility], in remission  Advised discussing surveillance imaging with his Oncologist. Pt is unsure if he wants to follow through with the recommended q6 month MRIs.

## 2025-07-09 ENCOUNTER — HOSPITAL ENCOUNTER (OUTPATIENT)
Dept: CT IMAGING | Facility: HOSPITAL | Age: 55
Discharge: HOME | End: 2025-07-09
Admitting: STUDENT IN AN ORGANIZED HEALTH CARE EDUCATION/TRAINING PROGRAM
Payer: OTHER GOVERNMENT

## 2025-07-09 DIAGNOSIS — C79.89 SECONDARY SQUAMOUS CELL CARCINOMA OF HEAD AND NECK: ICD-10-CM

## 2025-07-09 PROCEDURE — 70491 CT SOFT TISSUE NECK W/DYE: CPT

## 2025-07-09 PROCEDURE — 25510000001 IOPAMIDOL 61 % SOLUTION: Performed by: STUDENT IN AN ORGANIZED HEALTH CARE EDUCATION/TRAINING PROGRAM

## 2025-07-09 PROCEDURE — 71260 CT THORAX DX C+: CPT

## 2025-07-09 RX ORDER — IOPAMIDOL 612 MG/ML
85 INJECTION, SOLUTION INTRAVASCULAR
Status: COMPLETED | OUTPATIENT
Start: 2025-07-09 | End: 2025-07-09

## 2025-07-09 RX ADMIN — IOPAMIDOL 85 ML: 612 INJECTION, SOLUTION INTRAVENOUS at 09:23
